# Patient Record
Sex: MALE | Race: WHITE | NOT HISPANIC OR LATINO | Employment: FULL TIME | URBAN - METROPOLITAN AREA
[De-identification: names, ages, dates, MRNs, and addresses within clinical notes are randomized per-mention and may not be internally consistent; named-entity substitution may affect disease eponyms.]

---

## 2023-10-20 PROBLEM — K62.5 RECTAL BLEEDING: Status: ACTIVE | Noted: 2023-10-20

## 2023-10-20 PROBLEM — K64.4 EXTERNAL HEMORRHOIDS: Status: ACTIVE | Noted: 2023-10-20

## 2023-10-20 PROBLEM — M25.512 BILATERAL SHOULDER PAIN: Status: ACTIVE | Noted: 2023-10-20

## 2023-10-20 PROBLEM — E55.9 VITAMIN D INSUFFICIENCY: Status: ACTIVE | Noted: 2023-10-20

## 2023-10-20 PROBLEM — J47.9 ADULT BRONCHIECTASIS (MULTI): Status: ACTIVE | Noted: 2023-10-20

## 2023-10-20 PROBLEM — M25.511 BILATERAL SHOULDER PAIN: Status: ACTIVE | Noted: 2023-10-20

## 2023-10-20 PROBLEM — K64.8 BLEEDING INTERNAL HEMORRHOIDS: Status: ACTIVE | Noted: 2023-10-20

## 2023-10-20 PROBLEM — R03.0 BLOOD PRESSURE ELEVATED WITHOUT HISTORY OF HTN: Status: ACTIVE | Noted: 2023-10-20

## 2023-10-20 RX ORDER — DOCUSATE SODIUM 100 MG/1
CAPSULE, LIQUID FILLED ORAL
COMMUNITY
Start: 2022-07-15 | End: 2023-11-17 | Stop reason: WASHOUT

## 2023-10-20 RX ORDER — CHOLECALCIFEROL (VITAMIN D3) 50 MCG
TABLET ORAL
COMMUNITY

## 2023-11-17 ENCOUNTER — OFFICE VISIT (OUTPATIENT)
Dept: PRIMARY CARE | Facility: CLINIC | Age: 43
End: 2023-11-17
Payer: COMMERCIAL

## 2023-11-17 VITALS
DIASTOLIC BLOOD PRESSURE: 82 MMHG | HEART RATE: 64 BPM | HEIGHT: 73 IN | SYSTOLIC BLOOD PRESSURE: 136 MMHG | BODY MASS INDEX: 28.81 KG/M2 | WEIGHT: 217.4 LBS | RESPIRATION RATE: 16 BRPM

## 2023-11-17 DIAGNOSIS — Z13.220 SCREENING FOR LIPID DISORDERS: ICD-10-CM

## 2023-11-17 DIAGNOSIS — E55.9 VITAMIN D INSUFFICIENCY: ICD-10-CM

## 2023-11-17 DIAGNOSIS — S46.012A STRAIN OF LEFT ROTATOR CUFF CAPSULE, INITIAL ENCOUNTER: ICD-10-CM

## 2023-11-17 DIAGNOSIS — J47.9: ICD-10-CM

## 2023-11-17 DIAGNOSIS — Z13.29 SCREENING FOR THYROID DISORDER: ICD-10-CM

## 2023-11-17 DIAGNOSIS — Z00.00 ROUTINE GENERAL MEDICAL EXAMINATION AT A HEALTH CARE FACILITY: Primary | ICD-10-CM

## 2023-11-17 DIAGNOSIS — R03.0 BLOOD PRESSURE ELEVATED WITHOUT HISTORY OF HTN: ICD-10-CM

## 2023-11-17 PROBLEM — K62.5 RECTAL BLEEDING: Status: RESOLVED | Noted: 2023-10-20 | Resolved: 2023-11-17

## 2023-11-17 PROCEDURE — 1036F TOBACCO NON-USER: CPT | Performed by: INTERNAL MEDICINE

## 2023-11-17 PROCEDURE — 99396 PREV VISIT EST AGE 40-64: CPT | Performed by: INTERNAL MEDICINE

## 2023-11-17 ASSESSMENT — ENCOUNTER SYMPTOMS
ADENOPATHY: 0
VOICE CHANGE: 0
SPEECH DIFFICULTY: 0
VOMITING: 0
NAUSEA: 0
DIAPHORESIS: 0
WHEEZING: 0
DYSURIA: 0
FACIAL ASYMMETRY: 0
ARTHRALGIAS: 1
WOUND: 0
PALPITATIONS: 0
SHORTNESS OF BREATH: 0
MYALGIAS: 0
SLEEP DISTURBANCE: 0
PHOTOPHOBIA: 0
CHILLS: 0
TREMORS: 0
ANAL BLEEDING: 0
RECTAL PAIN: 0
DIFFICULTY URINATING: 0
FATIGUE: 0
HYPERACTIVE: 0
CHOKING: 0
FACIAL SWELLING: 0
ABDOMINAL PAIN: 0
DECREASED CONCENTRATION: 0
DYSPHORIC MOOD: 0
FREQUENCY: 0
BACK PAIN: 0
CHEST TIGHTNESS: 0
FLANK PAIN: 0
ABDOMINAL DISTENTION: 0
APPETITE CHANGE: 0
UNEXPECTED WEIGHT CHANGE: 0
APNEA: 0
ACTIVITY CHANGE: 0
COLOR CHANGE: 0
POLYPHAGIA: 0
NECK STIFFNESS: 0
JOINT SWELLING: 0
DIZZINESS: 0
SINUS PRESSURE: 0
HEADACHES: 0
NUMBNESS: 0
STRIDOR: 0
FEVER: 0
SINUS PAIN: 0
EYE PAIN: 0
TROUBLE SWALLOWING: 0
SEIZURES: 0
EYE REDNESS: 0
HALLUCINATIONS: 0
NERVOUS/ANXIOUS: 0
BLOOD IN STOOL: 0
RHINORRHEA: 0
DIARRHEA: 0
LIGHT-HEADEDNESS: 0
BRUISES/BLEEDS EASILY: 0
AGITATION: 0
NECK PAIN: 0
CONSTIPATION: 0
POLYDIPSIA: 0
CONFUSION: 0
WEAKNESS: 0
EYE DISCHARGE: 0
HEMATURIA: 0
SORE THROAT: 0
COUGH: 1
EYE ITCHING: 0

## 2023-11-17 NOTE — ASSESSMENT & PLAN NOTE
Encouraged pt to work on better diet-low salt and weight loss which will help lower BP naturally  Start back on home BP checks with goal of <130/80

## 2023-11-17 NOTE — ASSESSMENT & PLAN NOTE
Stable; no current issues   He sees pulm yearly (does spirometry at that time)-due in early part of the year   does pulmonary saline/toileting only as needed

## 2023-11-17 NOTE — PROGRESS NOTES
Subjective   Patient ID: Satinder Colon is a 43 y.o. male who presents for Annual Exam.    HPI    Pt here for full physical.  He is doing well overall.  He is up 4 lbs since last year.  He exercises some with playing Volleyball (1-2 times a week).  His diet is not great and he recognizes.      His breathing is ok.  He still coughs but he is able to manage it now.      His BP continues to be up at times-he went to donate blood and they noted it high.  He is asymptomatic.      He is having more pain/problems with his right shoulder.  About 4-5 weeks ago he really noted the pain starting back.  He did have one day that he had such severe pain he couldn't even lift arm above half way.  He still has some limitations at times.      He had colonoscopy 1/2023 that was normal.  Repeat noted for 10 years.      Review of Systems   Constitutional:  Negative for activity change, appetite change, chills, diaphoresis, fatigue, fever and unexpected weight change.   HENT:  Negative for congestion, dental problem, drooling, ear discharge, ear pain, facial swelling, hearing loss, mouth sores, nosebleeds, postnasal drip, rhinorrhea, sinus pressure, sinus pain, sneezing, sore throat, tinnitus, trouble swallowing and voice change.    Eyes:  Negative for photophobia, pain, discharge, redness, itching and visual disturbance.   Respiratory:  Positive for cough. Negative for apnea, choking, chest tightness, shortness of breath, wheezing and stridor.    Cardiovascular:  Negative for chest pain, palpitations and leg swelling.   Gastrointestinal:  Negative for abdominal distention, abdominal pain, anal bleeding, blood in stool, constipation, diarrhea, nausea, rectal pain and vomiting.   Endocrine: Negative for cold intolerance, heat intolerance, polydipsia, polyphagia and polyuria.   Genitourinary:  Negative for decreased urine volume, difficulty urinating, dysuria, enuresis, flank pain, frequency, genital sores, hematuria, penile discharge,  "penile pain, penile swelling, scrotal swelling, testicular pain and urgency.   Musculoskeletal:  Positive for arthralgias (right shoulder). Negative for back pain, gait problem, joint swelling, myalgias, neck pain and neck stiffness.   Skin:  Negative for color change, pallor, rash and wound.   Allergic/Immunologic: Negative for environmental allergies, food allergies and immunocompromised state.   Neurological:  Negative for dizziness, tremors, seizures, syncope, facial asymmetry, speech difficulty, weakness, light-headedness, numbness and headaches.   Hematological:  Negative for adenopathy. Does not bruise/bleed easily.   Psychiatric/Behavioral:  Negative for agitation, behavioral problems, confusion, decreased concentration, dysphoric mood, hallucinations, self-injury, sleep disturbance and suicidal ideas. The patient is not nervous/anxious and is not hyperactive.        Objective   /82 (BP Location: Right arm, Patient Position: Sitting)   Pulse 64   Resp 16   Ht 1.861 m (6' 1.25\")   Wt 98.6 kg (217 lb 6.4 oz)   BMI 28.49 kg/m²    Physical Exam  Constitutional:       Appearance: Normal appearance.   HENT:      Head: Normocephalic and atraumatic.      Right Ear: Tympanic membrane, ear canal and external ear normal. There is no impacted cerumen.      Left Ear: Tympanic membrane, ear canal and external ear normal. There is no impacted cerumen.      Nose: Nose normal. No congestion or rhinorrhea.      Mouth/Throat:      Mouth: Mucous membranes are moist.      Pharynx: Oropharynx is clear. No oropharyngeal exudate or posterior oropharyngeal erythema.   Eyes:      Extraocular Movements: Extraocular movements intact.      Conjunctiva/sclera: Conjunctivae normal.      Pupils: Pupils are equal, round, and reactive to light.   Neck:      Vascular: No carotid bruit.   Cardiovascular:      Rate and Rhythm: Normal rate and regular rhythm.      Pulses: Normal pulses.      Heart sounds: Normal heart sounds. No " murmur heard.  Pulmonary:      Effort: Pulmonary effort is normal. No respiratory distress.      Breath sounds: Normal breath sounds. No wheezing, rhonchi or rales.   Abdominal:      General: Abdomen is flat. Bowel sounds are normal. There is no distension.      Palpations: Abdomen is soft.      Tenderness: There is no abdominal tenderness.      Hernia: No hernia is present.   Musculoskeletal:         General: Tenderness (some pain noted of right shoulder when lifting arms above head) and signs of injury present. No swelling or deformity. Normal range of motion.      Cervical back: Normal range of motion and neck supple.      Right lower leg: No edema.      Left lower leg: No edema.      Comments: Pt has full ROM of right shoulder however notes discomfort when bringing arm above head and across chest  Able to hold against resistance at 90 degrees    Lymphadenopathy:      Cervical: No cervical adenopathy.   Skin:     General: Skin is warm and dry.      Findings: No lesion or rash.   Neurological:      General: No focal deficit present.      Mental Status: He is alert and oriented to person, place, and time.      Cranial Nerves: No cranial nerve deficit.      Sensory: No sensory deficit.      Motor: No weakness.   Psychiatric:         Mood and Affect: Mood normal.         Behavior: Behavior normal.         Thought Content: Thought content normal.         Judgment: Judgment normal.         Assessment/Plan   Problem List Items Addressed This Visit       Adult bronchiectasis (CMS/HCC)     Stable; no current issues   He sees pulm yearly (does spirometry at that time)-due in early part of the year   does pulmonary saline/toileting only as needed           Blood pressure elevated without history of HTN     Encouraged pt to work on better diet-low salt and weight loss which will help lower BP naturally  Start back on home BP checks with goal of <130/80         Vitamin D insufficiency     On supplementation  Will order blood  work to check levels          Relevant Orders    Vitamin D 25-Hydroxy,Total (for eval of Vitamin D levels)     Other Visit Diagnoses       Routine general medical examination at a health care facility    -  Primary    Relevant Orders    CBC    Comprehensive Metabolic Panel    Urinalysis with Reflex Microscopic    Follow Up In Primary Care - Health Maintenance    Strain of left rotator cuff capsule, initial encounter        Relevant Orders    Referral to Physical Therapy    Screening for lipid disorders        Relevant Orders    Lipid Panel    Screening for thyroid disorder        Relevant Orders    Thyroid Stimulating Hormone

## 2023-11-17 NOTE — PATIENT INSTRUCTIONS
Do recommend the covid booster due to having a lung disorder   Get fasting blood work and urine done in upcoming weeks  Continue to work on healthy diet and exercise regularly-would like to see better BP and weight loss   Would continue checking your BP at home-goal is <130/80 consistently-if you see it is high then let me know  For your shoulder this is likely rotator cuff strain/sprain-look up exercises for this but would go to formal PT to work on this area  Continue use of nebulizer/pulmonary saline to help with secretions/manage cough  See pulmonologist yearly  Follow up here in 1 year-sooner if needed

## 2023-11-20 ENCOUNTER — LAB (OUTPATIENT)
Dept: LAB | Facility: LAB | Age: 43
End: 2023-11-20
Payer: COMMERCIAL

## 2023-11-20 ENCOUNTER — TELEPHONE (OUTPATIENT)
Dept: PRIMARY CARE | Facility: CLINIC | Age: 43
End: 2023-11-20

## 2023-11-20 DIAGNOSIS — Z13.220 SCREENING FOR LIPID DISORDERS: ICD-10-CM

## 2023-11-20 DIAGNOSIS — Z00.00 ROUTINE GENERAL MEDICAL EXAMINATION AT A HEALTH CARE FACILITY: ICD-10-CM

## 2023-11-20 DIAGNOSIS — Z13.29 SCREENING FOR THYROID DISORDER: ICD-10-CM

## 2023-11-20 DIAGNOSIS — E55.9 VITAMIN D INSUFFICIENCY: ICD-10-CM

## 2023-11-20 LAB
25(OH)D3 SERPL-MCNC: 22 NG/ML (ref 30–100)
ALBUMIN SERPL BCP-MCNC: 4.4 G/DL (ref 3.4–5)
ALP SERPL-CCNC: 67 U/L (ref 33–120)
ALT SERPL W P-5'-P-CCNC: 17 U/L (ref 10–52)
ANION GAP SERPL CALC-SCNC: 11 MMOL/L (ref 10–20)
APPEARANCE UR: CLEAR
AST SERPL W P-5'-P-CCNC: 18 U/L (ref 9–39)
BILIRUB SERPL-MCNC: 0.5 MG/DL (ref 0–1.2)
BILIRUB UR STRIP.AUTO-MCNC: NEGATIVE MG/DL
BUN SERPL-MCNC: 14 MG/DL (ref 6–23)
CALCIUM SERPL-MCNC: 9.3 MG/DL (ref 8.6–10.6)
CHLORIDE SERPL-SCNC: 102 MMOL/L (ref 98–107)
CHOLEST SERPL-MCNC: 201 MG/DL (ref 0–199)
CHOLESTEROL/HDL RATIO: 3.7
CO2 SERPL-SCNC: 28 MMOL/L (ref 21–32)
COLOR UR: NORMAL
CREAT SERPL-MCNC: 1 MG/DL (ref 0.5–1.3)
ERYTHROCYTE [DISTWIDTH] IN BLOOD BY AUTOMATED COUNT: 12.8 % (ref 11.5–14.5)
GFR SERPL CREATININE-BSD FRML MDRD: >90 ML/MIN/1.73M*2
GLUCOSE SERPL-MCNC: 90 MG/DL (ref 74–99)
GLUCOSE UR STRIP.AUTO-MCNC: NEGATIVE MG/DL
HCT VFR BLD AUTO: 44 % (ref 41–52)
HDLC SERPL-MCNC: 54.8 MG/DL
HGB BLD-MCNC: 14.3 G/DL (ref 13.5–17.5)
KETONES UR STRIP.AUTO-MCNC: NEGATIVE MG/DL
LDLC SERPL CALC-MCNC: 122 MG/DL
LEUKOCYTE ESTERASE UR QL STRIP.AUTO: NEGATIVE
MCH RBC QN AUTO: 28 PG (ref 26–34)
MCHC RBC AUTO-ENTMCNC: 32.5 G/DL (ref 32–36)
MCV RBC AUTO: 86 FL (ref 80–100)
NITRITE UR QL STRIP.AUTO: NEGATIVE
NON HDL CHOLESTEROL: 146 MG/DL (ref 0–149)
NRBC BLD-RTO: 0 /100 WBCS (ref 0–0)
PH UR STRIP.AUTO: 6 [PH]
PLATELET # BLD AUTO: 246 X10*3/UL (ref 150–450)
POTASSIUM SERPL-SCNC: 4.4 MMOL/L (ref 3.5–5.3)
PROT SERPL-MCNC: 6.9 G/DL (ref 6.4–8.2)
PROT UR STRIP.AUTO-MCNC: NEGATIVE MG/DL
RBC # BLD AUTO: 5.1 X10*6/UL (ref 4.5–5.9)
RBC # UR STRIP.AUTO: NEGATIVE /UL
SODIUM SERPL-SCNC: 137 MMOL/L (ref 136–145)
SP GR UR STRIP.AUTO: 1.01
TRIGL SERPL-MCNC: 123 MG/DL (ref 0–149)
TSH SERPL-ACNC: 2.76 MIU/L (ref 0.44–3.98)
UROBILINOGEN UR STRIP.AUTO-MCNC: <2 MG/DL
VLDL: 25 MG/DL (ref 0–40)
WBC # BLD AUTO: 6.7 X10*3/UL (ref 4.4–11.3)

## 2023-11-20 PROCEDURE — 84443 ASSAY THYROID STIM HORMONE: CPT

## 2023-11-20 PROCEDURE — 80061 LIPID PANEL: CPT

## 2023-11-20 PROCEDURE — 85027 COMPLETE CBC AUTOMATED: CPT

## 2023-11-20 PROCEDURE — 80053 COMPREHEN METABOLIC PANEL: CPT

## 2023-11-20 PROCEDURE — 36415 COLL VENOUS BLD VENIPUNCTURE: CPT

## 2023-11-20 PROCEDURE — 82306 VITAMIN D 25 HYDROXY: CPT

## 2023-11-20 PROCEDURE — 81003 URINALYSIS AUTO W/O SCOPE: CPT

## 2023-11-20 NOTE — TELEPHONE ENCOUNTER
----- Message from Kathleen FLOWERS DO sent at 11/20/2023  1:00 PM EST -----  Vitamin d still low-increase to 5110-3599 units/day   Continue healthy diet cholesterol mildly elevated  All other labs ok

## 2023-12-06 ENCOUNTER — EVALUATION (OUTPATIENT)
Dept: PHYSICAL THERAPY | Facility: CLINIC | Age: 43
End: 2023-12-06
Payer: COMMERCIAL

## 2023-12-06 DIAGNOSIS — M25.511 RIGHT SHOULDER PAIN: Primary | ICD-10-CM

## 2023-12-06 PROCEDURE — 97110 THERAPEUTIC EXERCISES: CPT | Mod: GP | Performed by: PHYSICAL THERAPIST

## 2023-12-06 PROCEDURE — 97161 PT EVAL LOW COMPLEX 20 MIN: CPT | Mod: GP | Performed by: PHYSICAL THERAPIST

## 2023-12-06 ASSESSMENT — PATIENT HEALTH QUESTIONNAIRE - PHQ9
1. LITTLE INTEREST OR PLEASURE IN DOING THINGS: NOT AT ALL
SUM OF ALL RESPONSES TO PHQ9 QUESTIONS 1 AND 2: 0
2. FEELING DOWN, DEPRESSED OR HOPELESS: NOT AT ALL

## 2023-12-06 ASSESSMENT — ENCOUNTER SYMPTOMS
LOSS OF SENSATION IN FEET: 0
DEPRESSION: 0
OCCASIONAL FEELINGS OF UNSTEADINESS: 0

## 2023-12-06 ASSESSMENT — PAIN SCALES - GENERAL: PAINLEVEL_OUTOF10: 1

## 2023-12-06 NOTE — PROGRESS NOTES
Physical Therapy    Physical Therapy Upper Extremity Evaluation    Patient Name: Satinder Colon  MRN: [id number]  Today's Date: 12/6/2023  Time Calculation  Start Time: 0833  Stop Time: 0915  Time Calculation (min): 42 min    Current Problem  Problem List Items Addressed This Visit             ICD-10-CM    Right shoulder pain - Primary M25.511    Relevant Orders    Follow Up In Physical Therapy          Precautions  Precautions  Precautions Comment: None       Pain  Pain Score: 1  Pain Location: Shoulder  Pain Orientation: Right  Pain at worst: 6-7/10    SUBJECTIVE:   Chief complaint:  R shoulder pain   On and off   Began again early October   Woke up and could barely lift arm.   This has slowly improved but pain is still present   Plays volleyball and has pain with serving   Pain is anterior  Denies clicking/popping     Hand dominance:   Right     Aggravating factors:  Playing volleyball   R sidelying     Alleviating factors:  OTC meds prn     Imaging:  None     Prior level of function:   Previously independent with all activity     Functional limitations:  Playing Volleyball  Putting jacket on   R sidelying   Sometimes with reaching or other movement   Reaching up back     Home setup:  NA     Work:  Technology     Patients goal:  Return to normal function with pain and ensure there is no underlying px    Prior tx:  None     OBJECTIVE:    Upper Extremity ROM: (WNL unless documented below)   ROM in Degrees RIGHT LEFT   Shoulder Flexion Mild pain     Shoulder Abduction     Shoulder ER     Shoulder IR Audible pop, requires more effort than on the L      Upper Extremity Strength: (WNL unless documented below)   MMT 5/5 max  RIGHT LEFT   Shoulder Flexion 4+ pain  5   Shoulder Abduction 4+ pain  5   Shoulder ER 4- pain  5   Shoulder IR 4+ 5       Special tests: (WNL unless documented below)   SHOULDER RIGHT LEFT   Hawkin's-Austen +    Neer Impingement  +    Empty Can (Supraspinatus) +    Full Can  (Supraspinatus)  +    Lateral Aspen Test (Supraspinatus) -    Painful arc  -    Drop arm (Supra and Infra)     Speeds test (Bicep/Labrum) +        TREATMENT:  Initial evaluation completed. Issued and reviewed HEP with pt that included:  Access Code: [email or web address]  URL: [email or web address]  Date: 12/06/2023  Prepared by: Farideh Joseph    Exercises  - Shoulder Internal Rotation with Resistance  - 1 x daily - 7 x weekly - 2-3 sets - 10 reps  - Shoulder External Rotation with Anchored Resistance (Mirrored)  - 1 x daily - 7 x weekly - 2-3 sets - 10 reps  - Shoulder External Rotation with Resistance  - 1 x daily - 7 x weekly - 2-3 sets - 10 reps  - Standing Shoulder External Rotation in Abduction with Anchored Resistance  - 1 x daily - 7 x weekly - 2-3 sets - 10 reps  - Standing Shoulder Internal Rotation Stretch with Towel  - 1 x daily - 7 x weekly - 2-3 sets - 10 reps  - Standing Shoulder Row with Anchored Resistance  - 1 x daily - 7 x weekly - 3 sets - 10 reps  - Doorway Pec Stretch at 90 Degrees Abduction  - 1-2 x daily - 7 x weekly - 2 reps - 30 seconds hold    Patient Education  - Ergonomics for Shoulder Discomfort  - Shoulder Impingement  - Rotator Cuff Tendinopathy    Outcome Measure:  Other Measures  Disability of Arm Shoulder Hand (DASH): 20.45    ASSESSEMENT  The pt presents with signs and symptoms consistent with the physical therapy diagnosis of R shoulder pain. Pt demonstrates good AROM but with some reported pain. He has tests and measures consistent with possible impingement and RTC strain.   The pt will benefit from a therapy program to restore prior level of function, reduce pain, increase AROM, increase strength, and improve posture.    The physical therapy prognosis is good for the patient to achieve their goals.   The pt tolerated therapy treatment today well with no adverse effects.  Barriers to therapy include:  None    PLAN  The pt will be seen 1 time(s) a week for 4 weeks.       The pt has been educated about the risks and benefits of physical therapy including manual therapy treatments and gives consent for treatment.     The patient will benefit from physical therapy treatment to include: therapeutic exercises, therapeutic activities, neurological re-education, manual therapy, modalities, and a home exercise program.       Goals:  Active       PT Problem       Reduce pain at worst to 2/10 with all functional and recreational activity.        Start:  12/06/23    Expected End:  03/05/24            Increase ROM/flexibility to WFL to perform daily functional activities including dressing/bathing/grooming tasks and other functional and work related task       Start:  12/06/23    Expected End:  03/05/24            Increase by > or = 1/2 mm grade to improve lifting/carrying daily objects without increased pain/compensation         Start:  12/06/23    Expected End:  03/05/24            Decrease Quick Dash score by > or = 8 points        Start:  12/06/23    Expected End:  03/05/24            Patient will demonstrate independence in home program for support of progression       Start:  12/06/23    Expected End:  03/05/24

## 2023-12-12 ENCOUNTER — TREATMENT (OUTPATIENT)
Dept: PHYSICAL THERAPY | Facility: CLINIC | Age: 43
End: 2023-12-12
Payer: COMMERCIAL

## 2023-12-12 DIAGNOSIS — M25.511 RIGHT SHOULDER PAIN: ICD-10-CM

## 2023-12-12 PROCEDURE — 97110 THERAPEUTIC EXERCISES: CPT | Mod: GP | Performed by: PHYSICAL THERAPIST

## 2023-12-12 ASSESSMENT — PAIN SCALES - GENERAL: PAINLEVEL_OUTOF10: 0 - NO PAIN

## 2023-12-12 NOTE — PROGRESS NOTES
Physical Therapy Treatment    Patient Name: Satinder Colon  MRN: [id number]  Today's Date: 12/12/2023  Time Calculation  Start Time: 1315  Stop Time: 1357  Time Calculation (min): 42 min    Current Problem:  Problem List Items Addressed This Visit             ICD-10-CM    Right shoulder pain M25.511       Subjective   General:   Pt was compliant with HEP. For the most part his exercises went well. He did have some soreness with ER last night. He had 3 times where he had unexpected pain for no known reason. He also notes pain lifting his hand out of his pocket.     Pain:  Pain Score: 0 - No pain  Pain location: R shoulder    Precautions:  Precautions  Precautions Comment: None      Objective   No objective measures taken this visit    Treatment:    Therapeutic exercise  UBE L3 2.5/2.5  Reviewed appropriate tension for ER with HEP    Sleeper stretch   Pec stretch     Scaption YTB 2x10, 3 sec eccentric lowering   Abduction YTB 2x10, 3 sec eccentric lowering  D2 flexion YTB 2x10  Catch release flexion/abduction GMB 30 sec   90/90 ER with RMB against wall 2x10  Flexion with RMB against wall 2x10  BSB Y, T, I x10  BSB shoulder extension     Seated lat pulldowns BTB 2x15  Corner pec stretch 30 sec x 2     Next visit as able:   Wall push ups  Rebounder   BSB ER    Neuromuscular Re-education       Manual   LAD R shoulder   Pec STM-no tenderness   AP and inferior mobs R shoulder     Modalities  Declined    Assessment  Pt noted more strain in R with T position. Good tolerance otherwise. More fatigue on the R after D2 flexion.   Noted some deep anterior shoulder discomfort. Denied clicking however and not TTP. Discussed soreness post session is normal but should not last greater than 48 hrs     Plan  Continue to progress POC as tolerated by patient to improve strength, mobility and overall function    Goals:  Active       PT Problem       Reduce pain at worst to 2/10 with all functional and recreational activity.        Start:   12/06/23    Expected End:  03/05/24            Increase ROM/flexibility to WFL to perform daily functional activities including dressing/bathing/grooming tasks and other functional and work related task       Start:  12/06/23    Expected End:  03/05/24            Increase by > or = 1/2 mm grade to improve lifting/carrying daily objects without increased pain/compensation         Start:  12/06/23    Expected End:  03/05/24            Decrease Quick Dash score by > or = 8 points        Start:  12/06/23    Expected End:  03/05/24            Patient will demonstrate independence in home program for support of progression       Start:  12/06/23    Expected End:  03/05/24

## 2023-12-18 ENCOUNTER — TREATMENT (OUTPATIENT)
Dept: PHYSICAL THERAPY | Facility: CLINIC | Age: 43
End: 2023-12-18
Payer: COMMERCIAL

## 2023-12-18 DIAGNOSIS — M25.511 RIGHT SHOULDER PAIN: ICD-10-CM

## 2023-12-18 PROCEDURE — 97110 THERAPEUTIC EXERCISES: CPT | Mod: GP,CQ

## 2023-12-18 ASSESSMENT — PAIN SCALES - GENERAL: PAINLEVEL_OUTOF10: 0 - NO PAIN

## 2023-12-18 ASSESSMENT — PAIN - FUNCTIONAL ASSESSMENT: PAIN_FUNCTIONAL_ASSESSMENT: 0-10

## 2023-12-18 NOTE — PROGRESS NOTES
Physical Therapy Treatment    Patient Name: Satinder Colon  MRN: [id number]  Today's Date: 12/18/2023  Time Calculation  Start Time: 0830  Stop Time: 0915  Time Calculation (min): 45 min    Current Problem:  Problem List Items Addressed This Visit             ICD-10-CM    Right shoulder pain M25.511         Subjective   General:   Pt reports  shoulder is slowly improving, has not experiencing stabbing pain recently.   HEP is going well. He does notice some clicking with ER which is not audible or painful.   Has some pain with overhead serving in volleyball. Has playoffs tonight.     Pain:  Pain Assessment: 0-10  Pain Score: 0 - No pain  Pain Location: Shoulder  Pain Orientation: Right  Pain location: R shoulder    Precautions:  Precautions  Precautions Comment: None      Objective   No objective measures taken this visit    Treatment:    Therapeutic exercise  UBE L3 2.5/2.5    R Sleeper stretch 15 sec x 3   Pec stretch on BSB 2 x 30 sec    Scaption OTB 2x10, 3 sec eccentric lowering added to HEP   Abduction OTB 2x10, 3 sec eccentric lowering added to HEP   R D2 flexion OTB 2x10  Catch release flexion/abduction GMB 30 sec   90/90 ER with RMB against wall 2x10  Flexion with emphasis on posterior cuff with RMB against wall 2x10  BSB Y, T, I x10 -pain with Y so d/c  BSB shoulder extension 2# 2x10  BSB prone swimmer x10, x5    Seated lat pulldowns BTB 2x15    Wall push ups with plus 2x10   Rebounder RMB 2x10   BSB ER    Neuromuscular Re-education       Manual   LAD R shoulder   AP and inferior mobs R shoulder   TPR to teres/infraspinatus     Modalities  Declined    Assessment  Pt overall is progressing well thus far with decreased pain. Able to tolerate progressions well, with fatigue.   Voiced some discomfort in R shoulder with prone Y's so discontinued.   Pt TTP along posterior cuff, ajit infra and teres with manual tx. Advised pt that he may experience soreness post tx and to ice if needed      Plan  Continue to  progress POC as tolerated by patient to improve strength, mobility and overall function    Goals:  Active       PT Problem       Reduce pain at worst to 2/10 with all functional and recreational activity.        Start:  12/06/23    Expected End:  03/05/24            Increase ROM/flexibility to WFL to perform daily functional activities including dressing/bathing/grooming tasks and other functional and work related task       Start:  12/06/23    Expected End:  03/05/24            Increase by > or = 1/2 mm grade to improve lifting/carrying daily objects without increased pain/compensation         Start:  12/06/23    Expected End:  03/05/24            Decrease Quick Dash score by > or = 8 points        Start:  12/06/23    Expected End:  03/05/24            Patient will demonstrate independence in home program for support of progression       Start:  12/06/23    Expected End:  03/05/24

## 2023-12-20 ENCOUNTER — TREATMENT (OUTPATIENT)
Dept: PHYSICAL THERAPY | Facility: CLINIC | Age: 43
End: 2023-12-20
Payer: COMMERCIAL

## 2023-12-20 DIAGNOSIS — M25.511 RIGHT SHOULDER PAIN: ICD-10-CM

## 2023-12-20 PROCEDURE — 97140 MANUAL THERAPY 1/> REGIONS: CPT | Mod: GP,CQ

## 2023-12-20 PROCEDURE — 97110 THERAPEUTIC EXERCISES: CPT | Mod: GP,CQ

## 2023-12-20 ASSESSMENT — PAIN SCALES - GENERAL: PAINLEVEL_OUTOF10: 0 - NO PAIN

## 2023-12-20 ASSESSMENT — PAIN - FUNCTIONAL ASSESSMENT: PAIN_FUNCTIONAL_ASSESSMENT: 0-10

## 2023-12-20 NOTE — PROGRESS NOTES
Physical Therapy Treatment    Patient Name: Satinder Colon  MRN: [id number]  Today's Date: 12/20/2023  Time Calculation  Start Time: 0830  Stop Time: 0915  Time Calculation (min): 45 min    Current Problem:  Problem List Items Addressed This Visit             ICD-10-CM    Right shoulder pain M25.511       Subjective   General:   Pt reports he was sore after last session but only lasted a few hrs. He played 2 games in volleyball playoffs Mon night. Still dull ache pain with overhead movements /serving. Pain specifically as he comes post->anteriorly during serve.  Overall improvement in shoulder pain since starting PT.    Pain:  Pain Assessment: 0-10  Pain Score: 0 - No pain  Pain Location: Shoulder  Pain Orientation: Right  Pain location: R shoulder    Precautions:         Objective   No objective measures taken this visit    Treatment:    Therapeutic exercise  UBE L3 2.5/2.5    R Sleeper stretch 30 sec x 2 gentle   Pec stretch on BSB 2 x 30 sec    BL D2 flexion OTB 2x10  R D1 ext GTB 2x10   Catch release flexion/abduction GMB 30 sec   90/90 ER with RMB against wall 2x10  R Flexion with emphasis on posterior cuff (lift off wall)  with RMB against wall 2x10  BSB Y, T, I x10  ea   BSB prone swimmer x10, x5    Seated lat pulldowns BTB 2x15    Wall push ups with plus 2x10   Rebounder RMB 2x10 too easy- discharge after this visit   Chest press 5# DB 2x10, on plinth  Chest fly 5# DB 2x10, on plinth    Neuromuscular Re-education       Manual   LAD R shoulder   AP and inferior mobs R shoulder   TPR to teres/infraspinatus, levator   Informed consent given on manual treatment. Pt given opportunity to cease treatment at any time. Educated pt on expected soreness, possible ecchymosis. Pt voiced understanding  No adverse effects were noted post tx.      Modalities  Declined    Assessment  Pt overall tolerated exercises well without complaints of pain, just fatigue.   Voiced feeling more weak in R >L UE with chest press/fly.  No  "pain with \"Y\" today which is progress from previous session.   Audible clicking in R shoulder with RMB reaches but not pain so able to complete.   TTP with manual tx which improved as tx went on. No pain post tx.     Plan  Continue to progress POC as tolerated by patient to improve strength, mobility and overall function    Goals:  Active       PT Problem       Reduce pain at worst to 2/10 with all functional and recreational activity.        Start:  12/06/23    Expected End:  03/05/24            Increase ROM/flexibility to WFL to perform daily functional activities including dressing/bathing/grooming tasks and other functional and work related task       Start:  12/06/23    Expected End:  03/05/24            Increase by > or = 1/2 mm grade to improve lifting/carrying daily objects without increased pain/compensation         Start:  12/06/23    Expected End:  03/05/24            Decrease Quick Dash score by > or = 8 points        Start:  12/06/23    Expected End:  03/05/24            Patient will demonstrate independence in home program for support of progression       Start:  12/06/23    Expected End:  03/05/24               "

## 2023-12-27 ENCOUNTER — TREATMENT (OUTPATIENT)
Dept: PHYSICAL THERAPY | Facility: CLINIC | Age: 43
End: 2023-12-27
Payer: COMMERCIAL

## 2023-12-27 DIAGNOSIS — M25.511 RIGHT SHOULDER PAIN: Primary | ICD-10-CM

## 2023-12-27 PROCEDURE — 97110 THERAPEUTIC EXERCISES: CPT | Mod: GP | Performed by: PHYSICAL THERAPIST

## 2023-12-27 NOTE — PROGRESS NOTES
Physical Therapy Treatment    Patient Name: Satinder Colon  MRN: [id number]  Today's Date: 12/27/2023  Time Calculation  Start Time: 0918  Stop Time: 1000  Time Calculation (min): 42 min    Current Problem:  Problem List Items Addressed This Visit             ICD-10-CM    Right shoulder pain - Primary M25.511    Relevant Orders    Follow Up In Physical Therapy         Subjective   General:   Pt reports he has not experienced any stabbing pain in awhile. Notes improvement overall however states there is still some room for improvement. He notes some exercises are still fatiguing.   Pain:     Pain location: R shoulder    Precautions:         Objective   No objective measures taken this visit    Treatment:    Therapeutic exercise  UBE L3 2.5/2.5    R Sleeper stretch 30 sec x 2 gentle   Pec stretch on BSB 2 x 30 sec    Matrix rows 22.5#v 2x15  Matrix seated lat pulldown 17.5# 2x15    BL D2 flexion OTB 2x10  R D1 ext GTB 2x10   Catch release flexion/abduction RMB 30 sec   90/90 ER with RMB against wall 2x10  R Flexion with emphasis on posterior cuff (lift off wall)  with RMB against wall 2x10  BSB Y, T, I x10  ea   BSB prone swimmer x10, x5 (breast stroke)     Wall push ups with plus 2x10-progress to plinth next visit     Chest press 5# DB 2x15, on plinth  Chest fly 5# DB 2x15, on plinth  SA punch 5 lbs 2x15    Neuromuscular Re-education       Manual   Held due to massage scheduled tonight.  LAD R shoulder   AP and inferior mobs R shoulder   TPR to teres/infraspinatus, levator   Informed consent given on manual treatment. Pt given opportunity to cease treatment at any time. Educated pt on expected soreness, possible ecchymosis. Pt voiced understanding  No adverse effects were noted post tx.      Modalities  Declined    Assessment  Pt reported some fatigue particularly with D2 flexion. He noted some non painful clicking through range of D1 ext. Otherwise great effort with exercise progressions today.     Plan  Continue to  progress POC as tolerated by patient to improve strength, mobility and overall function    Goals:  Active       PT Problem       Reduce pain at worst to 2/10 with all functional and recreational activity.        Start:  12/06/23    Expected End:  03/05/24            Increase ROM/flexibility to WFL to perform daily functional activities including dressing/bathing/grooming tasks and other functional and work related task       Start:  12/06/23    Expected End:  03/05/24            Increase by > or = 1/2 mm grade to improve lifting/carrying daily objects without increased pain/compensation         Start:  12/06/23    Expected End:  03/05/24            Decrease Quick Dash score by > or = 8 points        Start:  12/06/23    Expected End:  03/05/24            Patient will demonstrate independence in home program for support of progression       Start:  12/06/23    Expected End:  03/05/24

## 2024-01-10 ENCOUNTER — TREATMENT (OUTPATIENT)
Dept: PHYSICAL THERAPY | Facility: CLINIC | Age: 44
End: 2024-01-10
Payer: COMMERCIAL

## 2024-01-10 DIAGNOSIS — M25.511 RIGHT SHOULDER PAIN: ICD-10-CM

## 2024-01-10 PROCEDURE — 97110 THERAPEUTIC EXERCISES: CPT | Mod: GP | Performed by: PHYSICAL THERAPIST

## 2024-01-10 ASSESSMENT — PAIN SCALES - GENERAL: PAINLEVEL_OUTOF10: 0 - NO PAIN

## 2024-01-10 NOTE — PROGRESS NOTES
Physical Therapy Treatment    Patient Name: Satinder Colon  MRN: [id number]  Today's Date: 1/10/2024  Time Calculation  Start Time: 1131  Stop Time: 1209  Time Calculation (min): 38 min    Current Problem:  Problem List Items Addressed This Visit             ICD-10-CM    Right shoulder pain M25.511           Subjective   General:   Pt reports he is doing good. Notes no stabbing pain in shoulder for quite some time. He played volleyball Monday and it felt good    Pain:  Pain Score: 0 - No pain  Pain Location: Shoulder  Pain Orientation: Right  Pain location: R shoulder    Precautions:  Precautions  Precautions Comment: None      Objective   Upper Extremity ROM: (WNL unless documented below)   No pain reported with MMT      Upper Extremity Strength: (WNL unless documented below)   MMT 5/5 max  RIGHT LEFT   Shoulder Flexion 5 5   Shoulder Abduction 5 5   Shoulder ER 5 5   Shoulder IR 5 5         Special tests: (WNL unless documented below)   SHOULDER RIGHT LEFT   Hawkin's-Austen -     Neer Impingement  -     Empty Can (Supraspinatus) -     Full Can (Supraspinatus)  -     Lateral Aspen Test (Supraspinatus) -     Painful arc  -     Drop arm (Supra and Infra)       Speeds test (Bicep/Labrum) -        Other Measures  Disability of Arm Shoulder Hand (DASH): 0     Treatment:    Therapeutic exercise  UBE L3 2.5/2.5  Recheck performed     Pec stretch on BSB 2 x 30 sec    Matrix rows 22.5# 2x15  Matrix seated lat pulldown 17.5# 2x15    BL D2 flexion OTB 2x10  Prone Y, T, I x10  ea   BSB prone swimmer x10, x5 (breast stroke)     Plinth push up 2x10    HEP updated:   Access Code: [email or web address]  URL: [email or web address]  Date: 01/10/2024  Prepared by: Farideh Joseph    Exercises  - Shoulder Internal Rotation with Resistance  - 1 x daily - 3-4 x weekly - 2-3 sets - 10 reps  - Shoulder External Rotation with Anchored Resistance (Mirrored)  - 1 x daily - 3-4 x weekly - 2-3 sets - 10 reps  - Shoulder External  Rotation with Resistance  - 1 x daily - 3-4 x weekly - 2-3 sets - 10 reps  - Standing Shoulder External Rotation in Abduction with Anchored Resistance  - 1 x daily - 3-4 x weekly - 2-3 sets - 10 reps  - Standing Shoulder Internal Rotation Stretch with Towel  - 1 x daily - 3-4 x weekly - 2-3 sets - 10 reps  - Standing Shoulder Row with Anchored Resistance  - 1 x daily - 3-4 x weekly - 3 sets - 10 reps  - Doorway Pec Stretch at 90 Degrees Abduction  - 1-2 x daily - 3-4 x weekly - 2 reps - 30 seconds hold  - Standing Shoulder Flexion with Resistance  - 1 x daily - 3-4 x weekly - 2-3 sets - 10 reps  - Standing Single Arm Shoulder Abduction with Resistance  - 1 x daily - 3-4 x weekly - 2-3 sets - 10 reps  - Seated Lat Pull Down with Resistance - Elbows Bent  - 1 x daily - 3-4 x weekly - 2-3 sets - 10 reps  - Standing Shoulder Single Arm PNF D2 Flexion with Resistance  - 1 x daily - 3-4 x weekly - 2-3 sets - 10 reps  - Prone Scapular Retraction Y  - 1 x daily - 3-4 x weekly - 2 sets - 10 reps  - Prone Shoulder Flexion with Dumbbells  - 1 x daily - 3-4 x weekly - 2 sets - 10 reps  - Prone Scapular Retraction Arms at Side  - 1 x daily - 3-4 x weekly - 2 sets - 10 reps  - Prone Scapular Retraction with Shoulder External Rotation in Abduction  - 1 x daily - 3-4 x weekly - 2 sets - 10 reps  - Push Up on Table  - 1 x daily - 3-4 x weekly - 2-3 sets - 10 reps    Patient Education  - Ergonomics for Shoulder Discomfort  - Shoulder Impingement  - Rotator Cuff Tendinopathy    Assessment  Pt demonstrates improved strength and ROM. No pain reported with testing. Negative specials tests today. This is much improved since initial visit. At this point he has met his functional goals and is appropriate for DC with continued compliance with updated HEP    Plan  Discharge to updated HEP.     Goals:  Resolved       PT Problem       Reduce pain at worst to 2/10 with all functional and recreational activity.  (Met)       Start:  12/06/23     Expected End:  03/05/24    Resolved:  01/10/24         Increase ROM/flexibility to WFL to perform daily functional activities including dressing/bathing/grooming tasks and other functional and work related task (Met)       Start:  12/06/23    Expected End:  03/05/24    Resolved:  01/10/24         Increase by > or = 1/2 mm grade to improve lifting/carrying daily objects without increased pain/compensation   (Met)       Start:  12/06/23    Expected End:  03/05/24    Resolved:  01/10/24         Decrease Quick Dash score by > or = 8 points  (Met)       Start:  12/06/23    Expected End:  03/05/24    Resolved:  01/10/24         Patient will demonstrate independence in home program for support of progression (Met)       Start:  12/06/23    Expected End:  03/05/24    Resolved:  01/10/24      Goal Note       Patient will demonstrate independence in home program for support of progression

## 2024-11-21 ENCOUNTER — APPOINTMENT (OUTPATIENT)
Dept: PRIMARY CARE | Facility: CLINIC | Age: 44
End: 2024-11-21
Payer: COMMERCIAL

## 2024-11-21 ENCOUNTER — LAB (OUTPATIENT)
Dept: LAB | Facility: LAB | Age: 44
End: 2024-11-21
Payer: COMMERCIAL

## 2024-11-21 VITALS
SYSTOLIC BLOOD PRESSURE: 130 MMHG | DIASTOLIC BLOOD PRESSURE: 85 MMHG | OXYGEN SATURATION: 99 % | WEIGHT: 198.2 LBS | HEIGHT: 74 IN | BODY MASS INDEX: 25.44 KG/M2 | HEART RATE: 56 BPM | TEMPERATURE: 97.6 F | RESPIRATION RATE: 16 BRPM

## 2024-11-21 DIAGNOSIS — R03.0 BORDERLINE HYPERTENSION: ICD-10-CM

## 2024-11-21 DIAGNOSIS — J47.9: ICD-10-CM

## 2024-11-21 DIAGNOSIS — Z13.29 SCREENING FOR THYROID DISORDER: ICD-10-CM

## 2024-11-21 DIAGNOSIS — Z00.00 ROUTINE GENERAL MEDICAL EXAMINATION AT A HEALTH CARE FACILITY: ICD-10-CM

## 2024-11-21 DIAGNOSIS — Z00.00 ROUTINE GENERAL MEDICAL EXAMINATION AT A HEALTH CARE FACILITY: Primary | ICD-10-CM

## 2024-11-21 DIAGNOSIS — E66.3 OVERWEIGHT WITH BODY MASS INDEX (BMI) OF 25 TO 25.9 IN ADULT: ICD-10-CM

## 2024-11-21 DIAGNOSIS — I73.00 RAYNAUD'S PHENOMENON WITHOUT GANGRENE: ICD-10-CM

## 2024-11-21 DIAGNOSIS — E55.9 VITAMIN D INSUFFICIENCY: ICD-10-CM

## 2024-11-21 DIAGNOSIS — Z13.220 SCREENING FOR LIPID DISORDERS: ICD-10-CM

## 2024-11-21 PROBLEM — M25.511 RIGHT SHOULDER PAIN: Status: RESOLVED | Noted: 2023-12-06 | Resolved: 2024-11-21

## 2024-11-21 PROBLEM — M25.512 BILATERAL SHOULDER PAIN: Status: RESOLVED | Noted: 2023-10-20 | Resolved: 2024-11-21

## 2024-11-21 PROBLEM — M25.511 BILATERAL SHOULDER PAIN: Status: RESOLVED | Noted: 2023-10-20 | Resolved: 2024-11-21

## 2024-11-21 LAB
25(OH)D3 SERPL-MCNC: 75 NG/ML (ref 30–100)
ALBUMIN SERPL BCP-MCNC: 4.6 G/DL (ref 3.4–5)
ALP SERPL-CCNC: 64 U/L (ref 33–120)
ALT SERPL W P-5'-P-CCNC: 15 U/L (ref 10–52)
ANION GAP SERPL CALC-SCNC: 14 MMOL/L (ref 10–20)
APPEARANCE UR: CLEAR
AST SERPL W P-5'-P-CCNC: 18 U/L (ref 9–39)
BILIRUB SERPL-MCNC: 0.6 MG/DL (ref 0–1.2)
BILIRUB UR STRIP.AUTO-MCNC: NEGATIVE MG/DL
BUN SERPL-MCNC: 15 MG/DL (ref 6–23)
CALCIUM SERPL-MCNC: 10 MG/DL (ref 8.6–10.6)
CHLORIDE SERPL-SCNC: 100 MMOL/L (ref 98–107)
CHOLEST SERPL-MCNC: 177 MG/DL (ref 0–199)
CHOLESTEROL/HDL RATIO: 3.1
CO2 SERPL-SCNC: 29 MMOL/L (ref 21–32)
COLOR UR: COLORLESS
CREAT SERPL-MCNC: 1 MG/DL (ref 0.5–1.3)
EGFRCR SERPLBLD CKD-EPI 2021: >90 ML/MIN/1.73M*2
ERYTHROCYTE [DISTWIDTH] IN BLOOD BY AUTOMATED COUNT: 12.9 % (ref 11.5–14.5)
GLUCOSE SERPL-MCNC: 83 MG/DL (ref 74–99)
GLUCOSE UR STRIP.AUTO-MCNC: NORMAL MG/DL
HCT VFR BLD AUTO: 46.1 % (ref 41–52)
HDLC SERPL-MCNC: 57.5 MG/DL
HGB BLD-MCNC: 15.5 G/DL (ref 13.5–17.5)
KETONES UR STRIP.AUTO-MCNC: NEGATIVE MG/DL
LDLC SERPL CALC-MCNC: 100 MG/DL
LEUKOCYTE ESTERASE UR QL STRIP.AUTO: NEGATIVE
MCH RBC QN AUTO: 28.5 PG (ref 26–34)
MCHC RBC AUTO-ENTMCNC: 33.6 G/DL (ref 32–36)
MCV RBC AUTO: 85 FL (ref 80–100)
NITRITE UR QL STRIP.AUTO: NEGATIVE
NON HDL CHOLESTEROL: 120 MG/DL (ref 0–149)
NRBC BLD-RTO: 0 /100 WBCS (ref 0–0)
PH UR STRIP.AUTO: 6.5 [PH]
PLATELET # BLD AUTO: 292 X10*3/UL (ref 150–450)
POTASSIUM SERPL-SCNC: 4.3 MMOL/L (ref 3.5–5.3)
PROT SERPL-MCNC: 7.4 G/DL (ref 6.4–8.2)
PROT UR STRIP.AUTO-MCNC: NEGATIVE MG/DL
RBC # BLD AUTO: 5.44 X10*6/UL (ref 4.5–5.9)
RBC # UR STRIP.AUTO: NEGATIVE /UL
SODIUM SERPL-SCNC: 139 MMOL/L (ref 136–145)
SP GR UR STRIP.AUTO: 1
TRIGL SERPL-MCNC: 96 MG/DL (ref 0–149)
TSH SERPL-ACNC: 3.16 MIU/L (ref 0.44–3.98)
UROBILINOGEN UR STRIP.AUTO-MCNC: NORMAL MG/DL
VLDL: 19 MG/DL (ref 0–40)
WBC # BLD AUTO: 8.3 X10*3/UL (ref 4.4–11.3)

## 2024-11-21 PROCEDURE — 82306 VITAMIN D 25 HYDROXY: CPT

## 2024-11-21 PROCEDURE — 85027 COMPLETE CBC AUTOMATED: CPT

## 2024-11-21 PROCEDURE — 99396 PREV VISIT EST AGE 40-64: CPT | Performed by: INTERNAL MEDICINE

## 2024-11-21 PROCEDURE — 80061 LIPID PANEL: CPT

## 2024-11-21 PROCEDURE — 36415 COLL VENOUS BLD VENIPUNCTURE: CPT

## 2024-11-21 PROCEDURE — 80053 COMPREHEN METABOLIC PANEL: CPT

## 2024-11-21 PROCEDURE — 3008F BODY MASS INDEX DOCD: CPT | Performed by: INTERNAL MEDICINE

## 2024-11-21 PROCEDURE — 81003 URINALYSIS AUTO W/O SCOPE: CPT

## 2024-11-21 PROCEDURE — 84443 ASSAY THYROID STIM HORMONE: CPT

## 2024-11-21 PROCEDURE — 1036F TOBACCO NON-USER: CPT | Performed by: INTERNAL MEDICINE

## 2024-11-21 RX ORDER — BISACODYL 5 MG
5 TABLET, DELAYED RELEASE (ENTERIC COATED) ORAL DAILY
COMMUNITY

## 2024-11-21 ASSESSMENT — PATIENT HEALTH QUESTIONNAIRE - PHQ9
2. FEELING DOWN, DEPRESSED OR HOPELESS: NOT AT ALL
1. LITTLE INTEREST OR PLEASURE IN DOING THINGS: NOT AT ALL
SUM OF ALL RESPONSES TO PHQ9 QUESTIONS 1 AND 2: 0

## 2024-11-21 ASSESSMENT — ENCOUNTER SYMPTOMS
RHINORRHEA: 0
SHORTNESS OF BREATH: 0
DIFFICULTY URINATING: 0
ABDOMINAL PAIN: 0
UNEXPECTED WEIGHT CHANGE: 0
ACTIVITY CHANGE: 0
STRIDOR: 0
NUMBNESS: 1
DYSPHORIC MOOD: 0
VOMITING: 0
PALPITATIONS: 0
ANAL BLEEDING: 0
MYALGIAS: 0
FEVER: 0
HALLUCINATIONS: 0
BLOOD IN STOOL: 0
PHOTOPHOBIA: 0
VOICE CHANGE: 0
FREQUENCY: 0
LIGHT-HEADEDNESS: 0
DIZZINESS: 0
JOINT SWELLING: 0
ARTHRALGIAS: 1
SPEECH DIFFICULTY: 0
SORE THROAT: 0
CONSTIPATION: 1
ABDOMINAL DISTENTION: 0
COUGH: 0
POLYPHAGIA: 0
HEADACHES: 0
BACK PAIN: 0
DYSURIA: 0
DIAPHORESIS: 0
FACIAL SWELLING: 0
WEAKNESS: 0
APPETITE CHANGE: 0
CHOKING: 0
CHEST TIGHTNESS: 0
DIARRHEA: 0
HYPERACTIVE: 0
WOUND: 0
EYE DISCHARGE: 0
HEMATURIA: 0
TROUBLE SWALLOWING: 0
POLYDIPSIA: 0
SLEEP DISTURBANCE: 0
FLANK PAIN: 0
CHILLS: 0
TREMORS: 0
EYE REDNESS: 0
BRUISES/BLEEDS EASILY: 0
EYE PAIN: 0
ADENOPATHY: 0
COLOR CHANGE: 0
NECK STIFFNESS: 0
FACIAL ASYMMETRY: 0
NECK PAIN: 0
WHEEZING: 0
SEIZURES: 0
FATIGUE: 0
NERVOUS/ANXIOUS: 0
RECTAL PAIN: 0
APNEA: 0
NAUSEA: 0
CONFUSION: 0
SINUS PAIN: 0
EYE ITCHING: 0
SINUS PRESSURE: 0
AGITATION: 0
DECREASED CONCENTRATION: 0

## 2024-11-21 NOTE — ASSESSMENT & PLAN NOTE
Avoid cold exposure, double glove, hand warmers and monitor  Discussed use of low dose amlodipine if needed  Some of the numbness may also be nerve impingement so patient going to monitor position at his work desk to avoid compression

## 2024-11-21 NOTE — PATIENT INSTRUCTIONS
Continue healthy diet and exercise regularly-congrats on weight loss already achieved  Get fasting blood work and urine done today-will call if abnormal  Continue vitamin D as directed  Monitor the numbness in the fingers-this could be nerve impingement vs vascular (raynaud's) -call if you are interested in low dose medication to help this  Follow up here in 1 year-sooner if needed

## 2024-11-21 NOTE — PROGRESS NOTES
Subjective   Patient ID: Satinder Colon is a 44 y.o. male who presents for Annual Exam.    HPI    Pt here for full physical.  He is down 19 lbs.  He doesn't feel much different.  His BP is always around the 130/80 vilma.    He is only seeing pulm once a year now.  He rarely has to use the nebulizer-1-2 times a year.  His breathing has been good.      He was playing volleyball in 1/2024 and when he came down after jumping he stepped on someone's foot and rolled/tour his tendons of the left ankle.  He had severe swelling to the ankle.  He did PT and is walking now on this.    His wife was diagnosed with breast cancer and had lumpectomy, chemo, and is finishing radiation therapy.        Review of Systems   Constitutional:  Negative for activity change, appetite change, chills, diaphoresis, fatigue, fever and unexpected weight change.   HENT:  Negative for congestion, dental problem, drooling, ear discharge, ear pain, facial swelling, hearing loss, mouth sores, nosebleeds, postnasal drip, rhinorrhea, sinus pressure, sinus pain, sneezing, sore throat, tinnitus, trouble swallowing and voice change.    Eyes:  Negative for photophobia, pain, discharge, redness, itching and visual disturbance.   Respiratory:  Negative for apnea, cough, choking, chest tightness, shortness of breath, wheezing and stridor.    Cardiovascular:  Negative for chest pain, palpitations and leg swelling.   Gastrointestinal:  Positive for constipation. Negative for abdominal distention, abdominal pain, anal bleeding, blood in stool, diarrhea, nausea, rectal pain and vomiting.   Endocrine: Negative for cold intolerance, heat intolerance, polydipsia, polyphagia and polyuria.   Genitourinary:  Negative for decreased urine volume, difficulty urinating, dysuria, enuresis, flank pain, frequency, genital sores, hematuria, penile discharge, penile pain, penile swelling, scrotal swelling, testicular pain and urgency.   Musculoskeletal:  Positive for  "arthralgias. Negative for back pain, gait problem, joint swelling, myalgias, neck pain and neck stiffness.        Raynaud's symptoms of fingertips    Skin:  Negative for color change, pallor, rash and wound.   Allergic/Immunologic: Negative for environmental allergies, food allergies and immunocompromised state.   Neurological:  Positive for numbness (fingertips L>R). Negative for dizziness, tremors, seizures, syncope, facial asymmetry, speech difficulty, weakness, light-headedness and headaches.   Hematological:  Negative for adenopathy. Does not bruise/bleed easily.   Psychiatric/Behavioral:  Negative for agitation, behavioral problems, confusion, decreased concentration, dysphoric mood, hallucinations, self-injury, sleep disturbance and suicidal ideas. The patient is not nervous/anxious and is not hyperactive.        Objective   /85 (BP Location: Right arm, Patient Position: Sitting)   Pulse 56   Temp 36.4 °C (97.6 °F) (Oral)   Resp 16   Ht 1.88 m (6' 2\")   Wt 89.9 kg (198 lb 3.2 oz)   SpO2 99%   BMI 25.45 kg/m²      Physical Exam  Constitutional:       Appearance: Normal appearance.   HENT:      Head: Normocephalic and atraumatic.      Right Ear: Tympanic membrane, ear canal and external ear normal. There is no impacted cerumen.      Left Ear: Tympanic membrane, ear canal and external ear normal. There is no impacted cerumen.      Nose: Nose normal. No congestion or rhinorrhea.      Mouth/Throat:      Mouth: Mucous membranes are moist.      Pharynx: Oropharynx is clear. No oropharyngeal exudate or posterior oropharyngeal erythema.   Eyes:      Extraocular Movements: Extraocular movements intact.      Conjunctiva/sclera: Conjunctivae normal.      Pupils: Pupils are equal, round, and reactive to light.   Neck:      Vascular: No carotid bruit.   Cardiovascular:      Rate and Rhythm: Normal rate and regular rhythm.      Pulses: Normal pulses.      Heart sounds: Normal heart sounds. No murmur " heard.  Pulmonary:      Effort: Pulmonary effort is normal. No respiratory distress.      Breath sounds: Normal breath sounds. No wheezing, rhonchi or rales.   Abdominal:      General: Abdomen is flat. Bowel sounds are normal. There is no distension.      Palpations: Abdomen is soft.      Tenderness: There is no abdominal tenderness.      Hernia: No hernia is present.   Musculoskeletal:         General: No swelling or tenderness. Normal range of motion.      Cervical back: Normal range of motion and neck supple.      Right lower leg: No edema.      Left lower leg: No edema.   Lymphadenopathy:      Cervical: No cervical adenopathy.   Skin:     General: Skin is warm and dry.      Findings: No lesion or rash.   Neurological:      General: No focal deficit present.      Mental Status: He is alert and oriented to person, place, and time.      Cranial Nerves: No cranial nerve deficit.      Sensory: No sensory deficit.      Motor: No weakness.   Psychiatric:         Mood and Affect: Mood normal.         Behavior: Behavior normal.         Thought Content: Thought content normal.         Judgment: Judgment normal.         Assessment/Plan   Problem List Items Addressed This Visit       Adult bronchiectasis (Multi)     No current issues  Sees pulm yearly          Borderline hypertension     Down in weight  BP stable  Continue to monitor including at home-goal is <130/80 consistently          Relevant Orders    Urinalysis with Reflex Microscopic    Vitamin D insufficiency     On supplementation  Repeat levels          Relevant Orders    Vitamin D 25-Hydroxy,Total (for eval of Vitamin D levels)    Raynaud's phenomenon without gangrene     Avoid cold exposure, double glove, hand warmers and monitor  Discussed use of low dose amlodipine if needed  Some of the numbness may also be nerve impingement so patient going to monitor position at his work desk to avoid compression           Other Visit Diagnoses       Routine general  medical examination at a health care facility    -  Primary    Relevant Orders    Comprehensive Metabolic Panel    CBC    Urinalysis with Reflex Microscopic    Follow Up In Primary Care - Health Maintenance    Overweight with body mass index (BMI) of 25 to 25.9 in adult        Screening for thyroid disorder        Relevant Orders    TSH with reflex to Free T4 if abnormal    Screening for lipid disorders        Relevant Orders    Lipid Panel

## 2025-11-25 ENCOUNTER — APPOINTMENT (OUTPATIENT)
Dept: PRIMARY CARE | Facility: CLINIC | Age: 45
End: 2025-11-25
Payer: COMMERCIAL